# Patient Record
Sex: MALE | Race: WHITE
[De-identification: names, ages, dates, MRNs, and addresses within clinical notes are randomized per-mention and may not be internally consistent; named-entity substitution may affect disease eponyms.]

---

## 2020-08-14 ENCOUNTER — HOSPITAL ENCOUNTER (EMERGENCY)
Dept: HOSPITAL 47 - EC | Age: 30
Discharge: HOME | End: 2020-08-14
Payer: COMMERCIAL

## 2020-08-14 VITALS
SYSTOLIC BLOOD PRESSURE: 126 MMHG | HEART RATE: 79 BPM | DIASTOLIC BLOOD PRESSURE: 75 MMHG | TEMPERATURE: 99 F | RESPIRATION RATE: 16 BRPM

## 2020-08-14 DIAGNOSIS — Z88.6: ICD-10-CM
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
## 2020-08-14 DIAGNOSIS — F17.200: ICD-10-CM

## 2020-08-14 DIAGNOSIS — M79.641: Primary | ICD-10-CM
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
## 2020-08-14 PROCEDURE — 99283 EMERGENCY DEPT VISIT LOW MDM: CPT
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
## 2024-09-16 ENCOUNTER — HOSPITAL ENCOUNTER (EMERGENCY)
Dept: HOSPITAL 47 - EC | Age: 34
Discharge: HOME | End: 2024-09-16
Payer: COMMERCIAL

## 2024-09-16 VITALS — HEART RATE: 87 BPM | TEMPERATURE: 98.3 F | DIASTOLIC BLOOD PRESSURE: 74 MMHG | SYSTOLIC BLOOD PRESSURE: 109 MMHG

## 2024-09-16 VITALS — RESPIRATION RATE: 18 BRPM

## 2024-09-16 DIAGNOSIS — B97.89: Primary | ICD-10-CM

## 2024-09-16 DIAGNOSIS — F17.200: ICD-10-CM

## 2024-09-16 DIAGNOSIS — J01.90: ICD-10-CM

## 2024-09-16 PROCEDURE — 71046 X-RAY EXAM CHEST 2 VIEWS: CPT

## 2024-09-16 PROCEDURE — 87651 STREP A DNA AMP PROBE: CPT

## 2024-09-16 PROCEDURE — 87636 SARSCOV2 & INF A&B AMP PRB: CPT

## 2024-09-16 PROCEDURE — 99283 EMERGENCY DEPT VISIT LOW MDM: CPT

## 2024-09-16 RX ADMIN — BENZONATATE STA MG: 100 CAPSULE ORAL at 20:07

## 2024-09-16 RX ADMIN — ACETAMINOPHEN STA MG: 500 TABLET ORAL at 19:01

## 2024-09-16 NOTE — XR
EXAMINATION TYPE: XR chest 2V

 

DATE OF EXAM: 9/16/2024

 

COMPARISON: None

 

INDICATION: Fever cough

 

TECHNIQUE:  Frontal and lateral views of the chest are obtained.

 

FINDINGS:  

The heart size is normal.  

The pulmonary vasculature is normal.

The lungs are clear.

 

IMPRESSION:  

1. No acute pulmonary process.

 

[facility] [place], Workstation: [id number], 9/16/2024 6:58 PM

## 2024-09-16 NOTE — ED
URI HPI





- General


Chief Complaint: Upper Respiratory Infection


Stated Complaint: Fever,Body aches


Time Seen by Provider: 09/16/24 18:51


Source: patient, RN notes reviewed


Mode of arrival: ambulatory


Limitations: no limitations





- History of Present Illness


Initial Comments: 





34-year-old male presented the ER with a chief complaint of cough, fevers and 

myalgias.  Patient states that been going on since Thursday, 9-.  Patient

reports he also hard he feels like he is going to vomit and is having stomach 

pain from coughing.  Patient is an everyday smoker.  Patient has no significant 

past medical history.  Patient has been taking over-the-counter Tylenol for 

fevers.  Last dose around noon today.  Patient is allergic to NSAIDs.  Patient 

denies any chest pain, urinary complaints, constipation or peripheral edema.  

Patient does report recent loose stools.  No melena or hematochezia.





- Related Data


                                  Previous Rx's











 Medication  Instructions  Recorded


 


Cephalexin [Keflex] 500 mg PO Q12HR 10 Days  cap 07/20/16


 


Benzonatate [Tessalon Perles] 100 mg PO TID PRN #15 capsule 09/16/24











                                    Allergies











Allergy/AdvReac Type Severity Reaction Status Date / Time


 


NSAIDS (Non-Steroidal Allergy  Anaphylaxis Verified 09/16/24 18:16





Anti-Inflamma     














Review of Systems


ROS Statement: 


Those systems with pertinent positive or pertinent negative responses have been 

documented in the HPI.





ROS Other: All systems not noted in ROS Statement are negative.





Past Medical History


Past Medical History: No Reported History


History of Any Multi-Drug Resistant Organisms: None Reported


Past Surgical History: Tonsillectomy


Past Psychological History: No Psychological Hx Reported


Smoking Status: Current every day smoker


Past Alcohol Use History: Occasional


Past Drug Use History: Marijuana





General Exam


Limitations: no limitations


General appearance: alert, in no apparent distress


ENT exam: Present: normal exam, normal oropharynx (Erythematous oropharynx), 

mucous membranes moist, TM's normal bilaterally


Neck exam: Present: normal inspection.  Absent: tenderness, meningismus, 

lymphadenopathy


Respiratory exam: Present: normal lung sounds bilaterally.  Absent: respiratory 

distress, wheezes, rales, rhonchi, stridor


Cardiovascular Exam: Present: regular rate, normal rhythm, normal heart sounds. 

Absent: systolic murmur, diastolic murmur, rubs, gallop, clicks


Extremities exam: Present: normal inspection, full ROM, normal capillary refill.

 Absent: tenderness, pedal edema, joint swelling, calf tenderness


Neurological exam: Present: alert, oriented X3, CN II-XII intact


Skin exam: Present: warm, dry, intact, normal color.  Absent: rash





Course


                                   Vital Signs











  09/16/24 09/16/24 09/16/24





  18:13 19:14 19:25


 


Temperature 100.6 F H  98.3 F


 


Pulse Rate 98  87


 


Respiratory 18 18 18





Rate   


 


Blood Pressure 132/78  109/74


 


O2 Sat by Pulse 98  96





Oximetry   














Medical Decision Making





- Medical Decision Making





Was pt. sent in by a medical professional or institution (, PA, NP, urgent 

care, hospital, or nursing home...) When possible be specific


@  -No


Did you speak to anyone other than the patient for history (EMS, parent, family,

police, friend...)? What history was obtained from this source 


@  -No


Did you review nursing and triage notes (agree or disagree)?  Why? 


@  -I reviewed and agree with nursing and triage notes


Were old charts reviewed (outside hosp., previous admission, EMS record, old 

EKG, old radiological studies, urgent care reports/EKG's, nursing home records)?

Report findings 


@  -No old charts were reviewed


Differential Diagnosis (chest pain, altered mental status, abdominal pain women,

abdominal pain men, vaginal bleeding, weakness, fever, dyspnea, syncope, 

headache, dizziness, GI bleed, back pain, seizure, CVA, palpatations, mental 

health, musculoskeletal)? 


@  -COVID, RSV, influenza, viral sinusitis, pneumonia this list is not meant to 

be all-inclusive


EKG interpreted by me (3pts min.).


@  -None


X-rays interpreted by me (1pt min.).


@  -Chest x-ray interpreted by me negative for acute cardiopulmonary process.


CT interpreted by me (1pt min.).


@  -None done


U/S interpreted by me (1pt. min.).


@  -None done


What testing was considered but not performed or refused? (CT, X-rays, U/S, 

labs)? Why?


@  -None


What meds were considered but not given or refused? Why?


@  -None


Did you discuss the management of the patient with other professionals 

(professionals i.e. , PA, NP, lab, RT, psych nurse, , , 

teacher, , )? Give summary


@  -No


Was smoking cessation discussed for >3mins.?


@  -I discussed smoking cessation for greater than 3 minutes.  The risk of 

smoking were discussed with the patient including but not limited to risks of 

cancer, stroke, coronary artery disease and COPD.  Also discussed with patient 

were multiple methods of quitting smoking.  Lastly we discussed the financial 

cost of smoking.


Was critical care preformed (if so, how long)?


@  -No


Were there social determinants of health that impacted care today? How? 

(Homelessness, low income, unemployed, alcoholism, drug addiction, tr

ansportation, low edu. Level, literacy, decrease access to med. care, [facility], 

rehab)?


@  -No


Was there de-escalation of care discussed even if they declined (Discuss DNR or 

withdrawal of care, Hospice)? DNR status


@  -No


What co-morbidities impacted this encounter? (DM, HTN, Smoking, COPD, CAD, 

Cancer, CVA, ARF, Chemo, Hep., AIDS, mental health diagnosis, sleep apnea, 

morbid obesity)?


@  -Smoking


Was patient admitted / discharged? Hospital course, mention meds given and 

route, prescriptions, significant lab abnormalities, going to OR and other 

pertinent info.


@  -Discharge.  34-year-old male presented to ER with a chief complaint of cough

and fever.  History and physical exam completed.  Patient is febrile upon 

examination with a temperature of 100.6.  Heart rate 98, respiratory rate 18, 

blood pressure 132/78, oxygen saturation 98% on room air.  Patient in no signs 

of acute distress.  Exam unremarkable.  Viral swabs negative.  Strep negative.  

Chest x-ray interpreted by me negative for acute cardiopulmonary process.  

Patient received p.o. Tylenol and Tessalon Perles in the ER for symptom control.

 Upon reevaluation, patient resting company in exam room no signs of acute dis

tress.  Symptoms believed to be viral in nature.  Tessalon Perles prescribed.  

Advise close follow-up with PCP.  Strict return parameters discussed.  Patient 

discharged stable condition.  Patient verbally expressed understanding agree 

with care plan.  Case discussed with ED attending Dr. Cheng.


Undiagnosed new problem with uncertain prognosis?


@  -No


Drug Therapy requiring intensive monitoring for toxicity (Heparin, Nitro, 

Insulin, Cardizem)?


@  -No


Were any procedures done?


@  -No


Diagnosis/symptom?


@  -Viral illness/acute viral sinusitis


Acute, or Chronic, or Acute on Chronic?


@  -Acute


Uncomplicated (without systemic symptoms) or Complicated (systemic symptoms)?


@  -Uncomplicated


Side effects of treatment?


@  -No


Exacerbation, Progression, or Severe Exacerbation?


@  -No


Poses a threat to life or bodily function? How? (Chest pain, USA, MI, pneumonia,

PE, COPD, DKA, ARF, appy, cholecystitis, CVA, Diverticulitis, Homicidal, 

Suicidal, threat to staff... and all critical care pts)


@  -No








- Lab Data


                                   Lab Results











  09/16/24 09/16/24 Range/Units





  18:50 18:50 


 


Influenza Type A (PCR)   Not Detected  (Not Detectd)  


 


Influenza Type B (PCR)   Not Detected  (Not Detectd)  


 


RSV (PCR)   Not Detected  (Not Detectd)  


 


SARS-CoV-2 (PCR)   Not Detected  (Not Detectd)  


 


Group A Strep (PCR)  NOT DETECTED   (Not Detectd)  














- Radiology Data


Radiology results: report reviewed, image reviewed





Disposition


Clinical Impression: 


 Viral illness, Acute viral sinusitis





Disposition: HOME SELF-CARE


Condition: Stable


Instructions (If sedation given, give patient instructions):  Fever in Adults 

(ED)


Additional Instructions: 


To Tylenol for pain control.  You can take Tessalon Perles up to 3 times a day 

for cough.  Follow-up with PCP.  Return to the ER for any new or worse concerns.


Prescriptions: 


Benzonatate [Tessalon Perles] 100 mg PO TID PRN #15 capsule


 PRN Reason: Cough


Is patient prescribed a controlled substance at d/c from ED?: No


Referrals: 


None,Stated [Primary Care Provider] - 1-2 days


Forms:   [facility]


Time of Disposition: 20:01

## 2025-01-22 ENCOUNTER — HOSPITAL ENCOUNTER (EMERGENCY)
Dept: HOSPITAL 47 - EC | Age: 35
Discharge: HOME | End: 2025-01-22
Payer: COMMERCIAL

## 2025-01-22 VITALS
TEMPERATURE: 98.6 F | SYSTOLIC BLOOD PRESSURE: 123 MMHG | DIASTOLIC BLOOD PRESSURE: 77 MMHG | HEART RATE: 63 BPM | RESPIRATION RATE: 16 BRPM

## 2025-01-22 DIAGNOSIS — F17.200: ICD-10-CM

## 2025-01-22 DIAGNOSIS — Z88.6: ICD-10-CM

## 2025-01-22 DIAGNOSIS — M62.830: Primary | ICD-10-CM

## 2025-01-22 PROCEDURE — 96372 THER/PROPH/DIAG INJ SC/IM: CPT

## 2025-01-22 PROCEDURE — 99283 EMERGENCY DEPT VISIT LOW MDM: CPT

## 2025-01-22 RX ADMIN — LIDOCAINE ONE PATCH: 4 PATCH TOPICAL at 20:33

## 2025-01-22 RX ADMIN — DEXTROSE STA MG: 50 INJECTION, SOLUTION INTRAVENOUS at 20:33

## 2025-01-22 RX ADMIN — ORPHENADRINE CITRATE STA MG: 30 INJECTION, SOLUTION INTRAMUSCULAR; INTRAVENOUS at 20:33

## 2025-01-22 NOTE — ED
Back Pain HPI





- General


Chief Complaint: Back Pain/Injury


Stated Complaint: back pain


Time Seen by Provider: 01/22/25 20:20


Source: patient


Limitations: no limitations





- History of Present Illness


Initial Comments: 


34-year-old male presenting with chief complaint of back pain.  Patient is 

having lower back pain ongoing for about 3 days.  Started after work, states 

that he lifts 40 to 50 pounds while at work.  Denies any other injury or trauma.

 States that pain worsens with certain motions, depending on how he moves it 

when either shoot up his back or down his leg.  No loss of bowel or bladder 

control or saddle paresthesia.  No abdominal pain, nausea, vomiting, fever, 

chills, urinary symptoms.  Has been taking Tylenol with little relief.








- Related Data


                                  Previous Rx's











 Medication  Instructions  Recorded


 


Cephalexin [Keflex] 500 mg PO Q12HR 10 Days  cap 07/20/16


 


Benzonatate [Tessalon Perles] 100 mg PO TID PRN #15 capsule 09/16/24


 


Cyclobenzaprine [Flexeril] 10 mg PO TID PRN #15 tab 01/22/25


 


Lidocaine 5% Patch [Lidoderm 5% 1 patch TOPICAL DAILY PRN #30 patch 01/22/25





Patch]  


 


methylPREDNISolone [Medrol Dose 0 mg PO AS DIRECTED #1 packet 01/22/25





Pack]  











                                    Allergies











Allergy/AdvReac Type Severity Reaction Status Date / Time


 


NSAIDS (Non-Steroidal Allergy  Anaphylaxis Verified 01/22/25 20:19





Anti-Inflamma     














Review of Systems


ROS Statement: 


Those systems with pertinent positive or pertinent negative responses have been 

documented in the HPI.





ROS Other: All systems not noted in ROS Statement are negative.





Past Medical History


Past Medical History: No Reported History


History of Any Multi-Drug Resistant Organisms: None Reported


Past Surgical History: Tonsillectomy


Past Psychological History: No Psychological Hx Reported


Smoking Status: Current every day smoker


Past Alcohol Use History: Occasional


Past Drug Use History: Marijuana





General Exam


Limitations: no limitations


General appearance: alert, in no apparent distress


Head exam: Present: atraumatic, normocephalic, normal inspection


Eye exam: Present: normal appearance, EOMI


Neck exam: Present: normal inspection.  Absent: meningismus


Respiratory exam: Absent: respiratory distress


Cardiovascular Exam: Present: regular rate


Back exam: Present: normal inspection, tenderness


Neurological exam: Present: alert, oriented X3


Psychiatric exam: Present: normal affect, normal mood


Skin exam: Present: warm, dry





Course


                                   Vital Signs











  01/22/25





  20:15


 


Temperature 98.7 F


 


Pulse Rate 95


 


Respiratory 18





Rate 


 


Blood Pressure 136/72


 


O2 Sat by Pulse 97





Oximetry 














Medical Decision Making





- Medical Decision Making


Was pt. sent in by a medical professional or institution (, PA, NP, urgent 

care, hospital, or nursing home...) When possible be specific


@  -No


Did you speak to anyone other than the patient for history (EMS, parent, family,

police, friend...)? What history was obtained from this source 


@  -No


Did you review nursing and triage notes (agree or disagree)?  Why? 


@  -I reviewed and agree with nursing and triage notes


Were old charts reviewed (outside hosp., previous admission, EMS record, old 

EKG, old radiological studies, urgent care reports/EKG's, nursing home records)?

Report findings 


@  -No old charts were reviewed


Differential Diagnosis (chest pain, altered mental status, abdominal pain women,

abdominal pain men, vaginal bleeding, weakness, fever, dyspnea, syncope, 

headache, dizziness, GI bleed, back pain, seizure, CVA, palpatations, mental 

health, musculoskeletal)? 


@  -


MDM Differential Back Pain:


Strain, zoster, cauda equina syndrome, epidural abscess, vertebral 

osteomyelitis, discitis, fracture, subluxation, disc herniation, DJD, spinal 

stenosis, dissection, AAA, pancreatitis, peptic ulcer disease, pyelonephritis, 

kidney stone this is not meant to be an all-inclusive list.





EKG interpreted by me (3pts min.).


@  -As above


X-rays interpreted by me (1pt min.).


@  -None done


CT interpreted by me (1pt min.).


@  -None done


U/S interpreted by me (1pt. min.).


@  -None done


What testing was considered but not performed or refused? (CT, X-rays, U/S, 

labs)? Why?


@  -None


What meds were considered but not given or refused? Why?


@  -None


Did you discuss the management of the patient with other professionals (imelda molina iRamoneCHELSEA Navarro Dr[address] NP, lab, RT, psych nurse, , , teacher, 

, )? Give summary


@  -No


Was smoking cessation discussed for >3mins.?


@  -No


Was critical care preformed (if so, how long)?


@  -No


Were there social determinants of health that impacted care today? How? 

(Homelessness, low income, unemployed, alcoholism, drug addiction, 

transportation, low edu. Level, literacy, decrease access to med. care, assisted, 

rehab)?


@  -No


Was there de-escalation of care discussed even if they declined (Discuss DNR or 

withdrawal of care, Hospice)? DNR status


@  -No


What co-morbidities impacted this encounter? (DM, HTN, Smoking, COPD, CAD, 

Cancer, CVA, ARF, Chemo, Hep., AIDS, mental health diagnosis, sleep apnea, 

morbid obesity)?


@  -None


Was patient admitted / discharged? Hospital course, mention meds given and 

route, prescriptions, significant lab abnormalities, going to OR and other 

pertinent info.


@  -34-year-old male presenting with chief complaint of lower back pain 

worsening left side.  No red flag symptoms.  History and physical examination 

are conducted.  Patient was treated with Norflex, Decadron, lidocaine patch.  On

reassessment he reports significant improvement in his symptoms.  He feels ready

for discharge home.  Follow-up with PCP.  Report back to ER with any new or 

worsening symptoms.  Discussed return parameters and answered all questions.  

Patient conveyed verbal understanding and agreed to the plan.  I discussed this 

case in detail with my attending Dr. Lee


Undiagnosed new problem with uncertain prognosis?


@  -No


Drug Therapy requiring intensive monitoring for toxicity (Heparin, Nitro, 

Insulin, Cardizem)?


@  -No


Were any procedures done?


@  -No


Diagnosis/symptom?


@  -Muscle spasm


Acute, or Chronic, or Acute on Chronic?


@  -Acute


Uncomplicated (without systemic symptoms) or Complicated (systemic symptoms)?


@  -Uncomplicated


Side effects of treatment?


@  -No


Exacerbation, Progression, or Severe Exacerbation?


@  -No


Poses a threat to life or bodily function? How? (Chest pain, USA, MI, pneumonia,

PE, COPD, DKA, ARF, appy, cholecystitis, CVA, Diverticulitis, Homicidal, 

Suicidal, threat to staff... and all critical care pts)


@  -Low likelihood of











Disposition


Clinical Impression: 


 Muscle spasm





Disposition: HOME SELF-CARE


Condition: Good


Instructions (If sedation given, give patient instructions):  Acute Low Back 

Pain (ED)


Additional Instructions: 


Follow-up with PCP.  Report back to ER with any new or worsening symptoms.  Take

medication as prescribed.  Do not take cyclobenzaprine prior to driving or 

operating heavy machinery.  Take Tylenol as needed.


Prescriptions: 


Cyclobenzaprine [Flexeril] 10 mg PO TID PRN #15 tab


 PRN Reason: spasms


Lidocaine 5% Patch [Lidoderm 5% Patch] 1 patch TOPICAL DAILY PRN #30 patch


 PRN Reason: Pain


methylPREDNISolone [Medrol Dose Pack] 0 mg PO AS DIRECTED #1 packet


Is patient prescribed a controlled substance at d/c from ED?: No


Referrals: 


None,Stated [Primary Care Provider] - 1-2 days


Forms:   [facility]


Time of Disposition: 21:24